# Patient Record
Sex: FEMALE | Race: WHITE | URBAN - METROPOLITAN AREA
[De-identification: names, ages, dates, MRNs, and addresses within clinical notes are randomized per-mention and may not be internally consistent; named-entity substitution may affect disease eponyms.]

---

## 2022-09-08 ENCOUNTER — OFFICE VISIT (OUTPATIENT)
Dept: ORTHOPEDIC SURGERY | Age: 57
End: 2022-09-08
Payer: COMMERCIAL

## 2022-09-08 VITALS — WEIGHT: 165 LBS | BODY MASS INDEX: 29.23 KG/M2 | HEIGHT: 63 IN

## 2022-09-08 DIAGNOSIS — M76.31 ILIOTIBIAL BAND SYNDROME AFFECTING LOWER LEG, RIGHT: ICD-10-CM

## 2022-09-08 DIAGNOSIS — M25.561 ACUTE PAIN OF RIGHT KNEE: Primary | ICD-10-CM

## 2022-09-08 PROCEDURE — 99203 OFFICE O/P NEW LOW 30 MIN: CPT | Performed by: ORTHOPAEDIC SURGERY

## 2022-09-08 NOTE — PROGRESS NOTES
12 [facility]  History and Physical      Date:  2022    Name:  Flako Means  Address:  [place] [address]    :  1965      Age:   59 y.o. Chief Complaint    Knee Pain (Right knee pain)      History of Present Illness:  Flako Conroy is a 59 y.o. female who presents for right knee pain. Patient states pain has been present for approximately 2 weeks. Denies recent injury or overuse. She had a total knee replacement done by Dr. Michelle Pugh in  which has done well since that time. She does have history of previous surgeries on the right including ACL and high tibial osteotomy prior to her knee replacement. Since the date of onset pain has been improving significantly. She presents today to ensure nothing is wrong with the total knee and no further measures need to be taken. Rates the severity at a 2 out of 10. And states she is able to walk without difficulty. The patient denies any new injury. The patient denies any catching, giving way, joint locking, numbness, paresthesias, or weakness. Pain Assessment  Location of Pain: Knee  Location Modifiers: Right  Severity of Pain: 2  Quality of Pain: Dull, Aching  Duration of Pain: A few minutes  Frequency of Pain: Intermittent  Aggravating Factors: Walking, Stairs  Limiting Behavior: Some  Result of Injury: No  Work-Related Injury: No  Are there other pain locations you wish to document?: No    No past medical history on file. No past surgical history on file. No family history on file. Social History     Socioeconomic History    Marital status:        No current outpatient medications on file. No current facility-administered medications for this visit.        Allergies   Allergen Reactions    Atorvastatin Other (See Comments)     Muscle aches, weakness/fatigue, joint pain    Quinolones Swelling     Facial Swelling  Facial Swelling           Review of Systems:  A 14 point review of systems was completed by the patient and is available in the media section of the scanned medical record and was reviewed. Vital Signs:   Ht 5' 3\" (1.6 m)   Wt 165 lb (74.8 kg)   LMP  (LMP Unknown)   BMI 29.23 kg/m²     General Exam:    General: Kalee Simmons is a healthy and well appearing 59y.o. year old female who is sitting comfortably in our office in no acute distress. Neuro: Alert & Oriented x 3,  normal,  no focal deficits noted. Normal mood & affect. Eyes: Sclera clear  Ears: Normal external ear  Mouth: Patient wearing a mask  Pulm: Respirations unlabored and regular   Skin: Warm, well perfused      Knee Examination: Right knee    Inspection:  No effusion, ecchymosis, discoloration, erythema, excessive warmth. no gross deformities, no signs of infection. Palpation: There is no patellofemoral crepitus, there is no joint line tenderness. Mild tenderness to palpation to iliotibial band insertion and over the lateral femoral condyle    Range of Motion:  0-130 degrees without pain or difficulty. Limited patellar mobility. Strength:  5/5 quadriceps, 5/5 hamstrings    Special Tests:  No ligament instability, valgus and varus stress test are stable at 0 and 30°. Stable anterior drawer. Negative posterior drawer. Negative Homans sign    Gait: Non antalgic, without the use of assistive devices    Alignment: Neutral    Neuro: Sensation equal bilateral lower extremities    Vascular: 2+ posterior tibialis pulse        Radiology:      X-rays obtained and reviewed in office: AP and merchant view of both knees and a lateral of the right knee. Impression: No fractures, dislocations, visible tumors, or signs of acute trauma. Total knee replacement visualized in good position without signs of hardware failure or loosening. There is slight patellar tilt with moderate bony overgrowth.       Office Procedures:  Orders Placed This Encounter   Procedures    XR KNEE RIGHT (3 VIEWS)     Standing Status:   Future     Number of Occurrences:   1     Standing Expiration Date:   9/8/2023     Order Specific Question:   Reason for exam:     Answer:   right knee pain            Assessment: Patient is a 59 y.o. female iliotibial band tendinitis right knee    Encounter Diagnoses   Name Primary? Acute pain of right knee Yes    Iliotibial band syndrome affecting lower leg, right        No orders of the defined types were placed in this encounter. Medical decision making:  Patient's workup and evaluation were reviewed with the patient in the office today. Imaging was reviewed with the patient. Patient's diagnosis of iliotibial band tendinitis was discussed at length. Assessment of her right total knee appears without complication. We recommended that patient continue on her conservative course of management. We recommended using Voltaren gel for symptomatic control. Therapy for hip strengthening will be considered if patient fails to improve over the next few weeks  She verbalized understanding of the diagnosis and the treatment plan     All the patient's questions were answered while in the clinic. The patient is understanding of all instructions and agrees with the plan. Treatment Plan:    Voltaren gel for symptom control. Home exercises as needed for hip strengthening. We will refer to physical therapy for formal hip strengthening exercises should symptoms fail to improve or worsen over the next few weeks. Patient to call if questions or concerns  Activity modification/Rest   Ice 20 minutes ever 1-2 hours PRN  Take medications as prescribed/instructed  Elevation   Lightweight exercise/low impact exercise  Appropriate diet/weight management      Follow up: As needed      Claudia Lemus D.O.  Orthopedic Surgeon, [facility] Fellow    09/08/22 1:05 PM    This patient exhibits moderate complexity for obtaining an independent history, reviewing past medical records, independent interpretation of diagnostic imaging, and further coordinating care. The patient exhibits low risk secondary to conservative management. This dictation was performed with a verbal recognition program (DRAGON) and it was checked for errors. It is possible that there are still dictated errors within this office note. If so, please bring any errors to my attention for an addendum. All efforts were made to ensure that this office note is accurate. This dictation was performed with a verbal recognition program (DRAGON) and it was checked for errors. It is possible that there are still dictated errors within this office note. If so, please bring any errors to my attention for an addendum. All efforts were made to ensure that this office note is accurate. Supervising Physician Attestation:  I, Dr. Jose Cooper, personally performed the services described in this documentation as above, and it is both accurate and complete and I agree with all pertinent clinical information. I personally interviewed the patient and performed a physical examination and medical decision making. I discussed the patient's condition and treatment options and have  reviewed and agree with the past medical, family and social history unless otherwise noted. All of the patient's questions were answered. I personally supervised the Orthopedic and Sportsmedicine Fellow when when noted in the evaluation and treatment plan of the patient.       Board Certified Orthopaedic Surgeon  [address] [facility] and [address] [place] [address]  [facility] and [address] [facility] [employer]  Professor of Morteza Evans